# Patient Record
Sex: FEMALE | Race: WHITE | NOT HISPANIC OR LATINO | Employment: OTHER | RURAL
[De-identification: names, ages, dates, MRNs, and addresses within clinical notes are randomized per-mention and may not be internally consistent; named-entity substitution may affect disease eponyms.]

---

## 2018-05-01 LAB — HEP C VIRUS AB: NORMAL

## 2018-06-11 ENCOUNTER — HISTORICAL (OUTPATIENT)
Dept: ADMINISTRATIVE | Facility: HOSPITAL | Age: 63
End: 2018-06-11

## 2018-06-14 LAB
LAB AP CLINICAL INFORMATION: NORMAL
LAB AP GENERAL CAT - HISTORICAL: NORMAL
LAB AP INTERPRETATION/RESULT - HISTORICAL: NEGATIVE
LAB AP SPECIMEN ADEQUACY - HISTORICAL: NORMAL
LAB AP SPECIMEN SUBMITTED - HISTORICAL: NORMAL

## 2019-09-23 ENCOUNTER — HISTORICAL (OUTPATIENT)
Dept: ADMINISTRATIVE | Facility: HOSPITAL | Age: 64
End: 2019-09-23

## 2019-10-08 LAB
ESTRIOL SERPL-MCNC: NORMAL NG/ML
LAB AP CAP CHECKLIST - HISTORICAL: NORMAL
LAB AP CLINICAL INFORMATION: NORMAL
LAB AP COMMENTS: NORMAL
LAB AP DIAGNOSIS - HISTORICAL: NORMAL
LAB AP GROSS PATHOLOGY - HISTORICAL: NORMAL
LAB AP SPECIMEN SUBMITTED - HISTORICAL: NORMAL

## 2019-10-15 ENCOUNTER — HISTORICAL (OUTPATIENT)
Dept: ADMINISTRATIVE | Facility: HOSPITAL | Age: 64
End: 2019-10-15

## 2019-10-17 LAB
LAB AP CLINICAL INFORMATION: NORMAL
LAB AP COMMENTS: NORMAL
LAB AP DIAGNOSIS - HISTORICAL: NORMAL
LAB AP GROSS PATHOLOGY - HISTORICAL: NORMAL
LAB AP SPECIMEN SUBMITTED - HISTORICAL: NORMAL

## 2020-11-03 ENCOUNTER — HISTORICAL (OUTPATIENT)
Dept: ADMINISTRATIVE | Facility: HOSPITAL | Age: 65
End: 2020-11-03

## 2021-11-08 ENCOUNTER — HOSPITAL ENCOUNTER (OUTPATIENT)
Dept: RADIOLOGY | Facility: HOSPITAL | Age: 66
Discharge: HOME | End: 2021-11-08
Payer: MEDICARE

## 2021-11-08 VITALS — HEIGHT: 63 IN | BODY MASS INDEX: 29.41 KG/M2 | WEIGHT: 166 LBS

## 2021-11-08 DIAGNOSIS — Z12.31 VISIT FOR SCREENING MAMMOGRAM: ICD-10-CM

## 2021-11-08 PROCEDURE — 77067 SCR MAMMO BI INCL CAD: CPT | Mod: 26,,, | Performed by: RADIOLOGY

## 2021-11-08 PROCEDURE — 77067 MAMMO DIGITAL SCREENING BILAT: ICD-10-PCS | Mod: 26,,, | Performed by: RADIOLOGY

## 2021-11-08 PROCEDURE — 77067 SCR MAMMO BI INCL CAD: CPT | Mod: TC

## 2021-11-12 RX ORDER — NEBIVOLOL 5 MG/1
5 TABLET ORAL NIGHTLY
COMMUNITY

## 2021-11-12 RX ORDER — NAPROXEN SODIUM 220 MG/1
81 TABLET, FILM COATED ORAL DAILY
COMMUNITY

## 2021-11-12 RX ORDER — HYDROCHLOROTHIAZIDE 12.5 MG/1
12.5 CAPSULE ORAL DAILY
COMMUNITY

## 2021-11-12 RX ORDER — ACETAMINOPHEN 500 MG
1000 TABLET ORAL 3 TIMES DAILY PRN
COMMUNITY

## 2021-11-15 ENCOUNTER — OFFICE VISIT (OUTPATIENT)
Dept: FAMILY MEDICINE | Facility: CLINIC | Age: 66
End: 2021-11-15
Payer: MEDICARE

## 2021-11-15 VITALS
DIASTOLIC BLOOD PRESSURE: 68 MMHG | HEIGHT: 63 IN | TEMPERATURE: 98 F | WEIGHT: 167 LBS | RESPIRATION RATE: 14 BRPM | BODY MASS INDEX: 29.59 KG/M2 | SYSTOLIC BLOOD PRESSURE: 132 MMHG | OXYGEN SATURATION: 99 % | HEART RATE: 74 BPM

## 2021-11-15 DIAGNOSIS — Z13.1 ENCOUNTER FOR SCREENING FOR DIABETES MELLITUS: ICD-10-CM

## 2021-11-15 DIAGNOSIS — E78.5 HYPERLIPIDEMIA, UNSPECIFIED HYPERLIPIDEMIA TYPE: ICD-10-CM

## 2021-11-15 DIAGNOSIS — Z23 ENCOUNTER FOR IMMUNIZATION: Primary | ICD-10-CM

## 2021-11-15 DIAGNOSIS — Z78.0 ASYMPTOMATIC MENOPAUSAL STATE: ICD-10-CM

## 2021-11-15 LAB
CHOLEST SERPL-MCNC: 215 MG/DL (ref 0–200)
CHOLEST/HDLC SERPL: 2.9 {RATIO}
GLUCOSE SERPL-MCNC: 89 MG/DL (ref 74–106)
HDLC SERPL-MCNC: 75 MG/DL (ref 40–60)
LDLC SERPL CALC-MCNC: 105 MG/DL
LDLC/HDLC SERPL: 1.4 {RATIO}
NONHDLC SERPL-MCNC: 140 MG/DL
TRIGL SERPL-MCNC: 174 MG/DL (ref 35–150)
VLDLC SERPL-MCNC: 35 MG/DL

## 2021-11-15 PROCEDURE — 80061 LIPID PANEL: ICD-10-PCS | Mod: ,,, | Performed by: CLINICAL MEDICAL LABORATORY

## 2021-11-15 PROCEDURE — G0008 FLU VACCINE - QUADRIVALENT - HIGH DOSE (65+) PRESERVATIVE FREE IM: ICD-10-PCS | Mod: ,,, | Performed by: FAMILY MEDICINE

## 2021-11-15 PROCEDURE — G0439 PPPS, SUBSEQ VISIT: HCPCS | Mod: ,,, | Performed by: FAMILY MEDICINE

## 2021-11-15 PROCEDURE — 82947 ASSAY GLUCOSE BLOOD QUANT: CPT | Mod: ,,, | Performed by: CLINICAL MEDICAL LABORATORY

## 2021-11-15 PROCEDURE — 90662 IIV NO PRSV INCREASED AG IM: CPT | Mod: ,,, | Performed by: FAMILY MEDICINE

## 2021-11-15 PROCEDURE — 82947 GLUCOSE, FASTING: ICD-10-PCS | Mod: ,,, | Performed by: CLINICAL MEDICAL LABORATORY

## 2021-11-15 PROCEDURE — 80061 LIPID PANEL: CPT | Mod: ,,, | Performed by: CLINICAL MEDICAL LABORATORY

## 2021-11-15 PROCEDURE — G0008 ADMIN INFLUENZA VIRUS VAC: HCPCS | Mod: ,,, | Performed by: FAMILY MEDICINE

## 2021-11-15 PROCEDURE — G0439 PR MEDICARE ANNUAL WELLNESS SUBSEQUENT VISIT: ICD-10-PCS | Mod: ,,, | Performed by: FAMILY MEDICINE

## 2021-11-15 PROCEDURE — 90662 FLU VACCINE - QUADRIVALENT - HIGH DOSE (65+) PRESERVATIVE FREE IM: ICD-10-PCS | Mod: ,,, | Performed by: FAMILY MEDICINE

## 2021-11-15 RX ORDER — MAGNESIUM 250 MG
500 TABLET ORAL DAILY
COMMUNITY

## 2021-11-15 RX ORDER — TOPIRAMATE 25 MG/1
50 TABLET ORAL 2 TIMES DAILY
COMMUNITY

## 2021-11-15 RX ORDER — LETROZOLE 2.5 MG/1
2.5 TABLET, FILM COATED ORAL DAILY
COMMUNITY

## 2021-11-15 RX ORDER — AMOXICILLIN 500 MG
1 CAPSULE ORAL DAILY
COMMUNITY

## 2021-11-15 RX ORDER — ROPINIROLE 0.5 MG/1
0.5 TABLET, FILM COATED ORAL 3 TIMES DAILY
COMMUNITY

## 2021-11-15 RX ORDER — MULTIVIT WITH MINERALS/HERBS
1 TABLET ORAL DAILY
COMMUNITY

## 2021-11-15 RX ORDER — VITAMIN E 268 MG
400 CAPSULE ORAL DAILY
COMMUNITY

## 2021-11-15 RX ORDER — PYRIDOXINE HCL (VITAMIN B6) 100 MG
100 TABLET ORAL DAILY
COMMUNITY

## 2021-11-15 RX ORDER — ASPIRIN 325 MG
325 TABLET ORAL DAILY
COMMUNITY

## 2021-11-15 RX ORDER — ZINC GLUCONATE 50 MG
50 TABLET ORAL DAILY
COMMUNITY

## 2021-11-15 RX ORDER — RIZATRIPTAN BENZOATE 5 MG/1
5 TABLET ORAL
COMMUNITY

## 2021-11-15 RX ORDER — OMEPRAZOLE 20 MG/1
20 CAPSULE, DELAYED RELEASE ORAL DAILY PRN
COMMUNITY

## 2021-11-15 RX ORDER — CYCLOBENZAPRINE HCL 5 MG
1 TABLET ORAL EVERY 8 HOURS PRN
COMMUNITY
Start: 2021-09-30

## 2021-11-15 RX ORDER — INDOMETHACIN 50 MG/1
1 CAPSULE ORAL EVERY 12 HOURS PRN
COMMUNITY
Start: 2021-09-30

## 2021-11-18 PROBLEM — E78.5 HYPERLIPIDEMIA: Status: ACTIVE | Noted: 2021-11-18

## 2021-11-18 PROBLEM — Z23 ENCOUNTER FOR IMMUNIZATION: Status: ACTIVE | Noted: 2021-11-18

## 2021-11-18 PROBLEM — Z13.1 ENCOUNTER FOR SCREENING FOR DIABETES MELLITUS: Status: ACTIVE | Noted: 2021-11-18

## 2021-11-18 PROBLEM — Z78.0 ASYMPTOMATIC MENOPAUSAL STATE: Status: ACTIVE | Noted: 2021-11-18

## 2021-12-02 ENCOUNTER — HOSPITAL ENCOUNTER (OUTPATIENT)
Dept: RADIOLOGY | Facility: HOSPITAL | Age: 66
Discharge: HOME | End: 2021-12-02
Attending: FAMILY MEDICINE
Payer: MEDICARE

## 2021-12-02 DIAGNOSIS — Z78.0 ASYMPTOMATIC MENOPAUSAL STATE: ICD-10-CM

## 2021-12-02 PROCEDURE — 77080 DXA BONE DENSITY AXIAL: CPT | Mod: TC

## 2022-01-27 ENCOUNTER — OFFICE VISIT (OUTPATIENT)
Dept: FAMILY MEDICINE | Facility: CLINIC | Age: 67
End: 2022-01-27
Payer: MEDICARE

## 2022-01-27 VITALS
OXYGEN SATURATION: 97 % | TEMPERATURE: 99 F | DIASTOLIC BLOOD PRESSURE: 62 MMHG | HEART RATE: 78 BPM | WEIGHT: 163 LBS | RESPIRATION RATE: 20 BRPM | SYSTOLIC BLOOD PRESSURE: 104 MMHG | BODY MASS INDEX: 28.87 KG/M2

## 2022-01-27 DIAGNOSIS — R50.9 FEVER, UNSPECIFIED FEVER CAUSE: ICD-10-CM

## 2022-01-27 DIAGNOSIS — U07.1 COVID: Primary | ICD-10-CM

## 2022-01-27 DIAGNOSIS — R05.9 COUGH: ICD-10-CM

## 2022-01-27 DIAGNOSIS — J18.9 PNEUMONIA DUE TO INFECTIOUS ORGANISM, UNSPECIFIED LATERALITY, UNSPECIFIED PART OF LUNG: ICD-10-CM

## 2022-01-27 LAB
CTP QC/QA: YES
FLUAV AG NPH QL: NEGATIVE
FLUBV AG NPH QL: NEGATIVE
S PYO RRNA THROAT QL PROBE: NEGATIVE
SARS-COV-2 AG RESP QL IA.RAPID: POSITIVE

## 2022-01-27 PROCEDURE — 87426 SARSCOV CORONAVIRUS AG IA: CPT | Mod: RHCUB | Performed by: NURSE PRACTITIONER

## 2022-01-27 PROCEDURE — 99214 OFFICE O/P EST MOD 30 MIN: CPT | Mod: CR,,, | Performed by: NURSE PRACTITIONER

## 2022-01-27 PROCEDURE — 87804 INFLUENZA ASSAY W/OPTIC: CPT | Mod: RHCUB | Performed by: NURSE PRACTITIONER

## 2022-01-27 PROCEDURE — 96372 THER/PROPH/DIAG INJ SC/IM: CPT | Mod: ,,, | Performed by: NURSE PRACTITIONER

## 2022-01-27 PROCEDURE — 96372 PR INJECTION,THERAP/PROPH/DIAG2ST, IM OR SUBCUT: ICD-10-PCS | Mod: ,,, | Performed by: NURSE PRACTITIONER

## 2022-01-27 PROCEDURE — 99214 PR OFFICE/OUTPT VISIT, EST, LEVL IV, 30-39 MIN: ICD-10-PCS | Mod: CR,,, | Performed by: NURSE PRACTITIONER

## 2022-01-27 PROCEDURE — 87880 STREP A ASSAY W/OPTIC: CPT | Mod: RHCUB | Performed by: NURSE PRACTITIONER

## 2022-01-27 RX ORDER — AZITHROMYCIN 250 MG/1
TABLET, FILM COATED ORAL
Qty: 6 TABLET | Refills: 0 | Status: SHIPPED | OUTPATIENT
Start: 2022-01-27 | End: 2022-02-01

## 2022-01-27 RX ORDER — PREDNISONE 10 MG/1
10 TABLET ORAL 2 TIMES DAILY
Qty: 10 TABLET | Refills: 0 | Status: SHIPPED | OUTPATIENT
Start: 2022-01-27

## 2022-01-27 RX ORDER — FAMOTIDINE 20 MG/1
20 TABLET, FILM COATED ORAL 2 TIMES DAILY
Qty: 20 TABLET | Refills: 0 | Status: SHIPPED | OUTPATIENT
Start: 2022-01-27 | End: 2022-02-24

## 2022-01-27 RX ORDER — DEXAMETHASONE SODIUM PHOSPHATE 4 MG/ML
4 INJECTION, SOLUTION INTRA-ARTICULAR; INTRALESIONAL; INTRAMUSCULAR; INTRAVENOUS; SOFT TISSUE
Status: COMPLETED | OUTPATIENT
Start: 2022-01-27 | End: 2022-01-27

## 2022-01-27 RX ORDER — CEFTRIAXONE 1 G/1
1 INJECTION, POWDER, FOR SOLUTION INTRAMUSCULAR; INTRAVENOUS
Status: COMPLETED | OUTPATIENT
Start: 2022-01-27 | End: 2022-01-27

## 2022-01-27 RX ADMIN — DEXAMETHASONE SODIUM PHOSPHATE 4 MG: 4 INJECTION, SOLUTION INTRA-ARTICULAR; INTRALESIONAL; INTRAMUSCULAR; INTRAVENOUS; SOFT TISSUE at 04:01

## 2022-01-27 RX ADMIN — CEFTRIAXONE 1 G: 1 INJECTION, POWDER, FOR SOLUTION INTRAMUSCULAR; INTRAVENOUS at 04:01

## 2022-01-27 NOTE — PATIENT INSTRUCTIONS
? If you develop symptoms get a test and stay home  *Applies to individuals who completed the primary series of Pfizer or Moderna ? Please let your provider know you have been exposed if you do go in for testing.  ? If you live in a household with a person who is a confirmed case of COVID-19, your last  exposure is when is when you last had contact less than 6 feet for 15 minutes or more.  ? During the 5 day quarantine at home you may be allowed Covid Medication List          Vitamin D 5,000 units twice a day     Zinc 50 mg twice a day     Pepcid 20 mg once a day     Zyrtec 10 mg once a day     Aspirin 325 mg once a day     Vitamin C 1000 mg twice a day     Melatonin 3 mg at bedtime     Increase fluid intake and rest!      Stay home until:    At least 5 days have passed since your symptoms began (or since your positive test, if you have no symptoms),    AND you have no more symptoms, or your symptoms are improving and you have no fever and do not need fever-reducing medication such as Tylenol (acetaminophen) or Advil (ibuprofen).    Remain home after 5 days as long as you have a fever. Remaining at home is important to prevent transmitting infection to others.      The [place] Department of Health ([facility]) recommends the following after  exposure to a COVID-19 infected person:  Any exposed individual who develops symptoms should be tested and stay home.  If you have had a booster shot OR you have been fully vaccinated but are not yet eligible for  a booster because of timing*, you should:  ? Wear a mask around others for 10 days.  ? Test on day 5, if possible.  ? If you develop symptoms get a test and stay home.  *Applies to individuals who completed the primary series of Pfizer or Moderna vaccine within the last 6  months OR completed the primary series of J&J vaccine within the last 2 months  If you are unvaccinated OR are currently eligible for a booster dose but have not yet received  one* you should:  ?  Stay home for 5 days. After that continue to wear a mask around others for 5 additional  days.  ? If you cant quarantine you must wear a mask for 10 days.  ? Test on day 5 if possible.vaccine over 6 months  ago and are not boosted OR completed the primary series of J&J over 2 months ago and are not  boosted  Additional considerations:to continue to work if your employer  says you are essential, and you continue to have no symptoms, undergo symptom and  temperature monitoring by your facility and wear a mask while you are at work and around  others. Contact your employer for approval.  o If you do return to work, you should continue to self-quarantine at home at all other  times.  Official CDC Update on Isolation and Quarantine can be found at  https://www.cdc.gov/media/releases/2021/s1227-isolation-quarantine-guidance.html  Patient Education       Pneumonia Discharge Instructions, Adult   About this topic   Pneumonia is an infection in your lungs. It is most often caused by bacteria and viruses. You may develop a fever, cough, have trouble breathing, feel weak, or have chest pain. Pneumonia can cause you to need help breathing with a machine called a ventilator. In some cases, pneumonia can even cause death.           What care is needed at home?   · Ask your doctor what you need to do when you go home. Make sure you ask questions if you do not understand what the doctor says.  · The doctor may have ordered antibiotics to treat an infection. It is important to take all your antibiotics even if you start to feel better.  · If you smoke, try to quit. Your doctor or nurse can help you with this.  · Stay away from smoke-filled places. Avoid other things that may cause breathing problems like fumes, pollution, dust, and other common allergens.  · If you have an inhaler to take when you are feeling short of breath, be sure to carry it with you all the time. Then, you can take it when needed. Take all your other medicines  as directed.  · Drink lots of water, juice, or broth to replace fluids lost through runny nose and fever.  · Take warm, steamy showers to help soothe the cough.  · Use a cool mist humidifier. This will make it easier to breathe.  · Use hard candy or cough drops to soothe sore throat and cough.  · Wash your hands often. This will help keep others healthy.  What follow-up care is needed?   Your doctor may ask you to make visits to the office to check on your progress. Be sure to keep these visits.  What drugs may be needed?   The doctor may order drugs to:  · Treat infection  · Loosen secretions  · Lower fever  · Control coughing  · Open the airways  · Help with swelling in your airways and lungs  You may be given inhalers to help your breathing. Talk with your doctor about how to take all of your drugs.  Will physical activity be limited?   · Get enough rest while recovering from your illness.  · Talk with your doctor about when you can return to your normal activities.  What can be done to prevent this health problem?   · Always cover your cough with the inside of your arm.  · Wash your hands often with soap and water for at least 20 seconds, especially after coughing or sneezing. Alcohol-based hand sanitizers also work.  · Do not get too close (kissing, hugging) to people who are sick. Ask visitors who have colds to wear a mask.  · If you have a cold, stay home from work or school. Wear a mask to help from spreading the infection.  · Do not share towels or hankies with anyone who is sick.  · Eat a healthy diet.  · Get a flu shot each year. Ask your doctor if you need a pneumonia shot or any other vaccines.  · Do not smoke or be around smoke or vape.  · Avoid drinking too much alcohol. Too much alcohol can lower your ability to fight off pneumonia.  When do I need to call the doctor?   · You are having so much trouble breathing that you can only say one or two words at a time.  · You need to sit upright at all times  to be able to breathe and/or cannot lie down.  · You have trouble breathing when talking or sitting still.  · Your shortness of breath has not gotten better after a few days.  · You are coughing up blood.  · You have a fever of 100.4°F (38°C) or higher or chills, even after taking your medicines.  · Your symptoms are not getting better in 3 to 4 days.  · You are still coughing in 3 to 4 weeks.  Teach Back: Helping You Understand   The Teach Back Method helps you understand the information we are giving you. After you talk with the staff, tell them in your own words what you learned. This helps to make sure the staff has described each thing clearly. It also helps to explain things that may have been confusing. Before going home, make sure you can do these:  · I can tell you about my condition.  · I can tell you what I can do to help avoid passing the infection to others.  · I can tell you what I will do if I have more trouble breathing, feel short of breath at rest, or my cough does not get better.  Where can I learn more?   American Lung Association  http://www.lungusa.org/lung-disease/pneumonia/understanding-pneumonia.html   Centers for Disease Control and Prevention  https://www.cdc.gov/pneumonia/index.html   FamilyDoctor.org  https://familydoctor.org/condition/pneumonia/   NHS Choices  https://www.nhs.uk/conditions/pneumonia/   Last Reviewed Date   2021-06-07  Consumer Information Use and Disclaimer   This information is not specific medical advice and does not replace information you receive from your health care provider. This is only a brief summary of general information. It does NOT include all information about conditions, illnesses, injuries, tests, procedures, treatments, therapies, discharge instructions or life-style choices that may apply to you. You must talk with your health care provider for complete information about your health and treatment options. This information should not be used to decide  whether or not to accept your health care providers advice, instructions or recommendations. Only your health care provider has the knowledge and training to provide advice that is right for you.  Copyright   Copyright © 2021 UpToDate, Inc. and its affiliates and/or licensors. All rights reserved.  Patient Education       Pneumonia, Adult ED   General Information   You came to the Emergency Department (ED) for pneumonia. Bacteria, viruses, or other germs can cause this infection. The doctor may order antibiotics or other medicines, based on the cause of your illness. You should start to feel better within about a week. You may feel tired and have a cough for a longer time.  What care is needed at home?   · Call your regular doctor to let them know you were in the ED. Make a follow-up appointment if you were told to.  · If you smoke, try to quit. Your doctor or nurse can help you with this.  · Stay away from smoke-filled places. Avoid other things that may cause breathing problems like fumes, pollution, dust, and other common allergens.  · If you have an inhaler to take when you are feeling short of breath, be sure to carry it with you all the time. Then, you can take it when needed. Take all your other medicines as directed.  · Drink lots of water, juice, or broth to replace fluids lost through runny nose and fever.  · Take warm, steamy showers to help soothe the cough.  · Use a cool mist humidifier. This will make it easier to breathe.  · Use hard candy or cough drops to soothe sore throat and cough.  · Wash your hands often. This will help keep others healthy.  When do I need to get emergency help?   · Call for an ambulance right away if:   ? You are having so much trouble breathing that you can only say one or two words at a time.  ? You need to sit upright at all times to be able to breathe and/or cannot lie down.  ? You are coughing up a lot of blood (more than 1 teaspoon or 5 mL).  · Return to the ED if:    ? You have a fever of 100.4°F (38°C) or higher or chills, even after taking your medicines.  ? You have trouble breathing when talking or sitting still.  When do I need to call the doctor?   · Your shortness of breath has not gotten better after a few days.  · You cough up a small amount of blood (less than 1 teaspoon or 5 mL).  · Your symptoms are not getting better in 3 to 4 days.  · You are still coughing in 3 to 4 weeks.  · You have new or worsening symptoms.  Last Reviewed Date   2020-09-16  Consumer Information Use and Disclaimer   This information is not specific medical advice and does not replace information you receive from your health care provider. This is only a brief summary of general information. It does NOT include all information about conditions, illnesses, injuries, tests, procedures, treatments, therapies, discharge instructions or life-style choices that may apply to you. You must talk with your health care provider for complete information about your health and treatment options. This information should not be used to decide whether or not to accept your health care providers advice, instructions or recommendations. Only your health care provider has the knowledge and training to provide advice that is right for you.  Copyright   Copyright © 2021 UpToDate, Inc. and its affiliates and/or licensors. All rights reserved.

## 2022-01-27 NOTE — PROGRESS NOTES
Monica Rivas NP   [facility]  [address] [address]  [place]     PATIENT NAME: Rachel Goldberg  : 1955  DATE: 22  MRN: [id number]      Billing Provider: Monica Rivas NP  Level of Service:   Patient PCP Information     Provider PCP Type    Nicho Castañeda DO General          Reason for Visit / Chief Complaint: Cough (C/o cough. Fever, h/a, sinus congestion)       Update PCP  Update Chief Complaint         History of Present Illness / Problem Focused Workflow     Rachel Goldberg presents to the clinic   C/o cough, fever, headache, sinus congestion x 6 days    Review of Systems     Review of Systems   Constitutional: Positive for fatigue and fever. Negative for chills.   HENT: Positive for nasal congestion and sinus pressure/congestion. Negative for ear pain, facial swelling, hearing loss, mouth dryness, mouth sores, postnasal drip, rhinorrhea and goiter.    Eyes: Negative for discharge and itching.   Respiratory: Positive for cough. Negative for shortness of breath and wheezing.    Cardiovascular: Negative for chest pain and leg swelling.   Gastrointestinal: Negative for abdominal pain, change in bowel habit and change in bowel habit.   Genitourinary: Negative for difficulty urinating, dysuria, enuresis, frequency, hematuria and urgency.   Neurological: Negative for dizziness, vertigo, syncope, weakness and headaches.   Psychiatric/Behavioral: Negative for decreased concentration.        Medical / Social / Family History     Past Medical History:   Diagnosis Date    A-fib     Arthritis     Breast cancer     Hyperlipidemia        Past Surgical History:   Procedure Laterality Date    BREAST LUMPECTOMY      CATARACT EXTRACTION      HYSTERECTOMY      OOPHORECTOMY      TUBAL LIGATION         Social History  Ms.  reports that she has never smoked. She has never used smokeless tobacco. She reports that she does not drink alcohol and does not use drugs.    Family History  Ms.'s family history  includes Cancer in her brother; Colon cancer in her paternal grandmother; Heart disease in her brother, father, mother, and son; Hypertension in her father and mother; Prostate cancer in her father; Thyroid disease in her father and mother.    Medications and Allergies     Medications  Outpatient Medications Marked as Taking for the 1/27/22 encounter (Office Visit) with Monica Rivas NP   Medication Sig Dispense Refill    aspirin 325 MG tablet Take 325 mg by mouth once daily.      aspirin 81 MG Chew Take 81 mg by mouth once daily.      b complex vitamins tablet Take 1 tablet by mouth once daily.      cyclobenzaprine (FLEXERIL) 5 MG tablet Take 1 tablet by mouth every 8 (eight) hours as needed.      hydroCHLOROthiazide (MICROZIDE) 12.5 mg capsule Take 12.5 mg by mouth once daily.      indomethacin (INDOCIN) 50 MG capsule Take 1 capsule by mouth every 12 (twelve) hours as needed.      INV folate 400 MCG tablet Take 800 mcg by mouth once daily. FOR INVESTIGATIONAL USE ONLY      Lactobacillus acidophilus (PROBIOTIC ORAL) Take 1 capsule by mouth 2 (two) times a day.      letrozole (FEMARA) 2.5 mg Tab Take 2.5 mg by mouth once daily.      magnesium 250 mg Tab Take 500 mg by mouth once daily.      multivitamin with minerals tablet Take 1 tablet by mouth once daily.      nebivoloL (BYSTOLIC) 5 MG Tab Take 5 mg by mouth every evening.      omega-3 fatty acids/fish oil (FISH OIL-OMEGA-3 FATTY ACIDS) 300-1,000 mg capsule Take 1 capsule by mouth once daily.      omeprazole (PRILOSEC) 20 MG capsule Take 20 mg by mouth daily as needed.      pyridoxine, vitamin B6, (B-6) 100 MG Tab Take 100 mg by mouth once daily.      rizatriptan (MAXALT) 5 MG tablet Take 5 mg by mouth every 2 (two) hours as needed for Migraine.      rOPINIRole (REQUIP) 0.5 MG tablet Take 0.5 mg by mouth 3 (three) times daily.      topiramate (TOPAMAX) 25 MG tablet Take 50 mg by mouth 2 (two) times daily.      vitamin E 400 UNIT capsule Take  400 Units by mouth once daily.      zinc gluconate 50 mg tablet Take 50 mg by mouth once daily.       Current Facility-Administered Medications for the 1/27/22 encounter (Office Visit) with Monica Rivas NP   Medication Dose Route Frequency Provider Last Rate Last Admin    [COMPLETED] cefTRIAXone injection 1 g  1 g Intramuscular 1 time in Clinic/HOD Monica Rivas NP   1 g at 01/27/22 1635    [COMPLETED] dexamethasone injection 4 mg  4 mg Intramuscular 1 time in Clinic/HOD Monica Rivas NP   4 mg at 01/27/22 1636       Allergies  Review of patient's allergies indicates:   Allergen Reactions    Toprol xl [metoprolol succinate] Palpitations       Physical Examination     Vitals:    01/27/22 1439   BP: 104/62   Pulse: 78   Resp: 20   Temp: 98.6 °F (37 °C)   SpO2: 97%   Weight: 73.9 kg (163 lb)      Physical Exam  Constitutional:       Appearance: Normal appearance.   HENT:      Head: Normocephalic.      Right Ear: Tympanic membrane, ear canal and external ear normal.      Left Ear: Tympanic membrane, ear canal and external ear normal.      Nose: Nose normal.      Comments: Mod amt thick yellow nasal secretion, pressure over max region     Mouth/Throat:      Mouth: Mucous membranes are moist.      Pharynx: Oropharynx is clear.   Eyes:      Extraocular Movements: Extraocular movements intact.      Conjunctiva/sclera: Conjunctivae normal.      Pupils: Pupils are equal, round, and reactive to light.   Neck:      Comments: Sai ant cervical nodes  Cardiovascular:      Rate and Rhythm: Normal rate and regular rhythm.      Pulses: Normal pulses.      Heart sounds: Normal heart sounds.   Pulmonary:      Effort: Pulmonary effort is normal.      Breath sounds: Normal breath sounds.      Comments: Coughs at intervals, mild rales noted   Abdominal:      General: Bowel sounds are normal.      Palpations: Abdomen is soft.   Musculoskeletal:         General: Normal range of motion.      Cervical back: Normal range of motion.    Lymphadenopathy:      Cervical: Cervical adenopathy present.   Skin:     General: Skin is warm and dry.      Capillary Refill: Capillary refill takes less than 2 seconds.   Neurological:      General: No focal deficit present.      Mental Status: She is alert and oriented to person, place, and time.   Psychiatric:         Mood and Affect: Mood normal.         Behavior: Behavior normal.          Assessment and Plan (including Health Maintenance)      Problem List  Smart Sets  Document Outside HM   :    Plan: ? due to symptoms, will also treat pt for pneumonia     If you develop symptoms get a test and stay home  *Applies to individuals who completed the primary series of Pfizer or Moderna ? Please let your provider know you have been exposed if you do go in for testing.  ? If you live in a household with a person who is a confirmed case of COVID-19, your last  exposure is when is when you last had contact less than 6 feet for 15 minutes or more.  ? During the 5 day quarantine at home you may be allowed Covid Medication List          Vitamin D 5,000 units twice a day     Zinc 50 mg twice a day     Pepcid 20 mg once a day     Zyrtec 10 mg once a day     Aspirin 325 mg once a day     Vitamin C 1000 mg twice a day     Melatonin 3 mg at bedtime     Increase fluid intake and rest!      Stay home until:    At least 5 days have passed since your symptoms began (or since your positive test, if you have no symptoms),    AND you have no more symptoms, or your symptoms are improving and you have no fever and do not need fever-reducing medication such as Tylenol (acetaminophen) or Advil (ibuprofen).    Remain home after 5 days as long as you have a fever. Remaining at home is important to prevent transmitting infection to others.      The [place] Department of Health ([facility]) recommends the following after  exposure to a COVID-19 infected person:  Any exposed individual who develops symptoms should be tested and stay  home.  If you have had a booster shot OR you have been fully vaccinated but are not yet eligible for  a booster because of timing*, you should:  ? Wear a mask around others for 10 days.  ? Test on day 5, if possible.  ? If you develop symptoms get a test and stay home.  *Applies to individuals who completed the primary series of Pfizer or Moderna vaccine within the last 6  months OR completed the primary series of J&J vaccine within the last 2 months  If you are unvaccinated OR are currently eligible for a booster dose but have not yet received  one* you should:  ? Stay home for 5 days. After that continue to wear a mask around others for 5 additional  days.  ? If you cant quarantine you must wear a mask for 10 days.  ? Test on day 5 if possible.vaccine over 6 months  ago and are not boosted OR completed the primary series of J&J over 2 months ago and are not  boosted  Additional considerations:to continue to work if your employer  says you are essential, and you continue to have no symptoms, undergo symptom and  temperature monitoring by your facility and wear a mask while you are at work and around  others. Contact your employer for approval.  o If you do return to work, you should continue to self-quarantine at home at all other  times.  Official CDC Update on Isolation and Quarantine can be found at  https://www.cdc.gov/media/releases/2021/s1227-isolation-quarantine-guidance.html      COVID  -     X-Ray Chest PA And Lateral; Future; Expected date: 01/27/2022  -     azithromycin (Z-MARTINEZ) 250 MG tablet; Take 2 tablets by mouth on day 1; Take 1 tablet by mouth on days 2-5  Dispense: 6 tablet; Refill: 0  -     famotidine (PEPCID) 20 MG tablet; Take 1 tablet (20 mg total) by mouth 2 (two) times daily.  Dispense: 20 tablet; Refill: 0  -     predniSONE (DELTASONE) 10 MG tablet; Take 1 tablet (10 mg total) by mouth 2 (two) times daily.  Dispense: 10 tablet; Refill: 0    Fever, unspecified fever cause  -     SARS  Coronavirus 2 Antigen, POCT  -     POCT Influenza A/B  -     POCT rapid strep A  -     X-Ray Chest PA And Lateral; Future; Expected date: 01/27/2022    Cough  -     X-Ray Chest PA And Lateral; Future; Expected date: 01/27/2022    Pneumonia due to infectious organism, unspecified laterality, unspecified part of lung  -     cefTRIAXone injection 1 g  -     dexamethasone injection 4 mg            Health Maintenance Due   Topic Date Due    Shingles Vaccine (1 of 2) Never done       Problem List Items Addressed This Visit        Pulmonary    Pneumonia due to infectious organism    Relevant Medications    cefTRIAXone injection 1 g (Completed)    dexamethasone injection 4 mg (Completed)    Cough    Relevant Orders    X-Ray Chest PA And Lateral (Completed)       Other    COVID - Primary    Relevant Medications    azithromycin (Z-MARTINEZ) 250 MG tablet    famotidine (PEPCID) 20 MG tablet    predniSONE (DELTASONE) 10 MG tablet    Other Relevant Orders    X-Ray Chest PA And Lateral (Completed)    Fever    Relevant Orders    SARS Coronavirus 2 Antigen, POCT (Completed)    POCT Influenza A/B (Completed)    POCT rapid strep A (Completed)    X-Ray Chest PA And Lateral (Completed)            Health Maintenance Topics with due status: Not Due       Topic Last Completion Date    TETANUS VACCINE 05/01/2018    Colorectal Cancer Screening 06/21/2018    Mammogram 11/08/2021    Lipid Panel 11/15/2021    DEXA SCAN 12/02/2021       Procedures     Future Appointments   Date Time Provider Department Center   1/31/2022 11:00 AM Nicho Castañeda DO [facility] [place]   2/9/2022  8:15 AM [facility]    11/18/2022 10:30 AM [facility] [place]   [facility]2022  [facility]        Follow up for monday with dr castañeda.       Signature:  Monica Rivas NP    Date of encounter: 1/27/22

## 2022-01-31 ENCOUNTER — OFFICE VISIT (OUTPATIENT)
Dept: FAMILY MEDICINE | Facility: CLINIC | Age: 67
End: 2022-01-31
Payer: MEDICARE

## 2022-01-31 VITALS
SYSTOLIC BLOOD PRESSURE: 130 MMHG | BODY MASS INDEX: 28.88 KG/M2 | OXYGEN SATURATION: 97 % | WEIGHT: 163 LBS | HEART RATE: 60 BPM | RESPIRATION RATE: 16 BRPM | DIASTOLIC BLOOD PRESSURE: 76 MMHG | HEIGHT: 63 IN | TEMPERATURE: 98 F

## 2022-01-31 DIAGNOSIS — U07.1 COVID-19: Primary | ICD-10-CM

## 2022-01-31 PROCEDURE — 99213 OFFICE O/P EST LOW 20 MIN: CPT | Mod: CR,,, | Performed by: FAMILY MEDICINE

## 2022-01-31 PROCEDURE — 99213 PR OFFICE/OUTPT VISIT, EST, LEVL III, 20-29 MIN: ICD-10-PCS | Mod: CR,,, | Performed by: FAMILY MEDICINE

## 2022-01-31 RX ORDER — BENZONATATE 100 MG/1
100 CAPSULE ORAL 3 TIMES DAILY PRN
Qty: 21 CAPSULE | Refills: 0 | Status: SHIPPED | OUTPATIENT
Start: 2022-01-31 | End: 2022-02-07

## 2022-01-31 NOTE — PROGRESS NOTES
Nicho Castañeda DO   [facility]  [address]  [place]     PATIENT NAME: Rachel Goldberg  : 1955  DATE: 22  MRN: [id number]      Billing Provider: Nicho Castañeda DO  Level of Service:   Patient PCP Information     Provider PCP Type    Nicho Castañeda DO General          Reason for Visit / Chief Complaint: Pneumonia (F/u from last Thursday. Had Covid and pneumonia. ) and COVID-19 Concerns       Update PCP  Update Chief Complaint         History of Present Illness / Problem Focused Workflow     Rachel Goldberg presents to the clinic had covid and PNA last week. Reports compliance with antibiotics.states feels better.       Review of Systems     Review of Systems   Constitutional: Negative.    Eyes: Negative.    Respiratory: Negative.    Cardiovascular: Negative.    Gastrointestinal: Negative.         Medical / Social / Family History     Past Medical History:   Diagnosis Date    A-fib     Arthritis     Breast cancer     Hyperlipidemia        Past Surgical History:   Procedure Laterality Date    BREAST LUMPECTOMY      CATARACT EXTRACTION      HYSTERECTOMY      OOPHORECTOMY      TUBAL LIGATION         Social History  Ms.  reports that she has never smoked. She has never used smokeless tobacco. She reports that she does not drink alcohol and does not use drugs.    Family History  Ms.'s family history includes Cancer in her brother; Colon cancer in her paternal grandmother; Heart disease in her brother, father, mother, and son; Hypertension in her father and mother; Prostate cancer in her father; Thyroid disease in her father and mother.    Medications and Allergies     Medications  Outpatient Medications Marked as Taking for the 22 encounter (Office Visit) with Nicho Castañeda DO   Medication Sig Dispense Refill    acetaminophen (TYLENOL) 500 MG tablet Take 1,000 mg by mouth 3 (three) times daily as needed for Pain.      aspirin 325 MG tablet Take 325 mg by  mouth once daily.      azithromycin (Z-MARTINEZ) 250 MG tablet Take 2 tablets by mouth on day 1; Take 1 tablet by mouth on days 2-5 6 tablet 0    b complex vitamins tablet Take 1 tablet by mouth once daily.      cyclobenzaprine (FLEXERIL) 5 MG tablet Take 1 tablet by mouth every 8 (eight) hours as needed.      famotidine (PEPCID) 20 MG tablet Take 1 tablet (20 mg total) by mouth 2 (two) times daily. 20 tablet 0    hydroCHLOROthiazide (MICROZIDE) 12.5 mg capsule Take 12.5 mg by mouth once daily.      indomethacin (INDOCIN) 50 MG capsule Take 1 capsule by mouth every 12 (twelve) hours as needed.      INV folate 400 MCG tablet Take 800 mcg by mouth once daily. FOR INVESTIGATIONAL USE ONLY      Lactobacillus acidophilus (PROBIOTIC ORAL) Take 1 capsule by mouth 2 (two) times a day.      letrozole (FEMARA) 2.5 mg Tab Take 2.5 mg by mouth once daily.      magnesium 250 mg Tab Take 500 mg by mouth once daily.      multivitamin with minerals tablet Take 1 tablet by mouth once daily.      nebivoloL (BYSTOLIC) 5 MG Tab Take 5 mg by mouth every evening.      omega-3 fatty acids/fish oil (FISH OIL-OMEGA-3 FATTY ACIDS) 300-1,000 mg capsule Take 1 capsule by mouth once daily.      omeprazole (PRILOSEC) 20 MG capsule Take 20 mg by mouth daily as needed.      predniSONE (DELTASONE) 10 MG tablet Take 1 tablet (10 mg total) by mouth 2 (two) times daily. 10 tablet 0    pyridoxine, vitamin B6, (B-6) 100 MG Tab Take 100 mg by mouth once daily.      rizatriptan (MAXALT) 5 MG tablet Take 5 mg by mouth every 2 (two) hours as needed for Migraine.      rOPINIRole (REQUIP) 0.5 MG tablet Take 0.5 mg by mouth 3 (three) times daily.      topiramate (TOPAMAX) 25 MG tablet Take 50 mg by mouth 2 (two) times daily.      vitamin E 400 UNIT capsule Take 400 Units by mouth once daily.      zinc gluconate 50 mg tablet Take 50 mg by mouth once daily.         Allergies  Review of patient's allergies indicates:   Allergen Reactions     "Toprol xl [metoprolol succinate] Palpitations       Physical Examination     Vitals:    01/31/22 1141   BP: 130/76   BP Location: Right arm   Patient Position: Sitting   Pulse: 60   Resp: 16   Temp: 98.1 °F (36.7 °C)   TempSrc: Oral   SpO2: 97%   Weight: 73.9 kg (163 lb)   Height: 5' 3" (1.6 m)      Physical Exam  Vitals and nursing note reviewed.   Constitutional:       General: She is not in acute distress.     Appearance: Normal appearance. She is normal weight. She is not ill-appearing, toxic-appearing or diaphoretic.   Cardiovascular:      Rate and Rhythm: Normal rate and regular rhythm.      Pulses: Normal pulses.      Heart sounds: Normal heart sounds.   Pulmonary:      Effort: Pulmonary effort is normal.      Breath sounds: Normal breath sounds.   Neurological:      Mental Status: She is alert.          Assessment and Plan (including Health Maintenance)      Problem List  Smart Sets  Document Outside HM   :    Plan:   tessalon  Educated pt on intermittent cough persisting after lung infection for up to 90days.  progress in weeks not days    Health Maintenance Due   Topic Date Due    Shingles Vaccine (1 of 2) Never done       Problem List Items Addressed This Visit    None       There are no diagnoses linked to this encounter.   Health Maintenance Topics with due status: Not Due       Topic Last Completion Date    TETANUS VACCINE 05/01/2018    Colorectal Cancer Screening 06/21/2018    Mammogram 11/08/2021    Lipid Panel 11/15/2021    DEXA SCAN 12/02/2021       Procedures     Future Appointments   Date Time Provider Department Center   2/9/2022  8:15 AM [facility]    11/18/2022 10:30 AM AWV NURSE, [facility] MELONIE [place]   [facility]        No follow-ups on file.       Signature:  Nicho Castañeda DO    Date of encounter: 1/31/22    Education Documentation  No documentation found.  Education " Comments  No comments found.       There are no Patient Instructions on file for this visit.

## 2022-02-09 ENCOUNTER — HOSPITAL ENCOUNTER (OUTPATIENT)
Dept: RADIOLOGY | Facility: HOSPITAL | Age: 67
Discharge: HOME | End: 2022-02-09
Attending: NURSE PRACTITIONER
Payer: MEDICARE

## 2022-02-09 DIAGNOSIS — M54.59 OTHER LOW BACK PAIN: ICD-10-CM

## 2022-02-09 PROCEDURE — 72148 MRI LUMBAR SPINE W/O DYE: CPT | Mod: TC

## 2022-02-21 PROBLEM — Z13.1 ENCOUNTER FOR SCREENING FOR DIABETES MELLITUS: Status: RESOLVED | Noted: 2021-11-18 | Resolved: 2022-02-21

## 2022-03-11 DIAGNOSIS — Z71.89 COMPLEX CARE COORDINATION: ICD-10-CM

## 2022-10-09 DIAGNOSIS — Z71.89 COMPLEX CARE COORDINATION: ICD-10-CM

## 2022-12-08 ENCOUNTER — HOSPITAL ENCOUNTER (OUTPATIENT)
Dept: RADIOLOGY | Facility: HOSPITAL | Age: 67
Discharge: HOME | End: 2022-12-08
Payer: MEDICARE

## 2022-12-08 DIAGNOSIS — Z12.31 VISIT FOR SCREENING MAMMOGRAM: ICD-10-CM

## 2022-12-08 PROCEDURE — 77067 SCR MAMMO BI INCL CAD: CPT | Mod: 26,,, | Performed by: RADIOLOGY

## 2022-12-08 PROCEDURE — 77067 SCR MAMMO BI INCL CAD: CPT | Mod: TC

## 2022-12-08 PROCEDURE — 77067 MAMMO DIGITAL SCREENING BILAT: ICD-10-PCS | Mod: 26,,, | Performed by: RADIOLOGY

## 2024-01-03 ENCOUNTER — HOSPITAL ENCOUNTER (OUTPATIENT)
Dept: RADIOLOGY | Facility: HOSPITAL | Age: 69
Discharge: HOME | End: 2024-01-03
Payer: MEDICARE

## 2024-01-03 DIAGNOSIS — Z12.31 VISIT FOR SCREENING MAMMOGRAM: ICD-10-CM

## 2024-01-03 PROCEDURE — 77067 SCR MAMMO BI INCL CAD: CPT | Mod: 26,,, | Performed by: RADIOLOGY

## 2024-01-03 PROCEDURE — 77067 SCR MAMMO BI INCL CAD: CPT | Mod: TC

## 2024-04-12 ENCOUNTER — OFFICE VISIT (OUTPATIENT)
Dept: FAMILY MEDICINE | Facility: CLINIC | Age: 69
End: 2024-04-12
Payer: MEDICARE

## 2024-04-12 VITALS
WEIGHT: 170.63 LBS | DIASTOLIC BLOOD PRESSURE: 86 MMHG | TEMPERATURE: 97 F | OXYGEN SATURATION: 96 % | RESPIRATION RATE: 19 BRPM | SYSTOLIC BLOOD PRESSURE: 128 MMHG | HEIGHT: 63 IN | HEART RATE: 70 BPM | BODY MASS INDEX: 30.23 KG/M2

## 2024-04-12 DIAGNOSIS — L23.7 POISON IVY DERMATITIS: Primary | ICD-10-CM

## 2024-04-12 PROCEDURE — 99213 OFFICE O/P EST LOW 20 MIN: CPT | Mod: ,,, | Performed by: STUDENT IN AN ORGANIZED HEALTH CARE EDUCATION/TRAINING PROGRAM

## 2024-04-12 RX ORDER — METHYLPREDNISOLONE 4 MG/1
TABLET ORAL
Qty: 21 EACH | Refills: 0 | Status: SHIPPED | OUTPATIENT
Start: 2024-04-12 | End: 2024-05-03

## 2024-04-12 RX ORDER — TRIAMCINOLONE ACETONIDE 1 MG/G
OINTMENT TOPICAL 2 TIMES DAILY
Qty: 15 G | Refills: 0 | Status: SHIPPED | OUTPATIENT
Start: 2024-04-12

## 2024-04-12 NOTE — PROGRESS NOTES
Progress Note     KIN MONTENEGRO MD   [address]  [place] [address]     PATIENT NAME: Rachel Goldberg  : 1955  DATE: 24  MRN: [id number]      Billing Provider: KIN MONTENEGRO MD  Level of Service:   Patient PCP Information       Provider PCP Type    MAITE Llamas General                Rash (Patient states that she had gotten into some poison ivy at home and has been breaking out all over. patient states that she noticed it a day before yesterday. )      SUBJECTIVE:     Rachel Goldberg is a 68 y.o.female who presents to clinic for Rash (Patient states that she had gotten into some poison ivy at home and has been breaking out all over. patient states that she noticed it a day before yesterday. )    Patient presents to clinic today after exposure to poison ivy.  Patient states that she was working in her yd over the weekend.  She was known history of poison ivy dermatitis.  Patient states that Monday/Tuesday she started to develop lesions.  She developed a new lesion yesterday.  Patient states that she was developing lesions on her legs, her upper extremities, her hands, and now her neck face.  She notes that the rash is pruritic.  Patient has had steroids in the past and has tolerated them without difficulty.    All other pertinent review of systems negative. Please see HPI for details.     Past Medical History:  has a past medical history of A-fib, Arthritis, Breast cancer, and Hyperlipidemia.   Past Surgical History:  has a past surgical history that includes Breast lumpectomy; Hysterectomy; Oophorectomy; Cataract extraction; and Tubal ligation.  Family History: family history includes Cancer in her brother; Colon cancer in her paternal grandmother; Heart disease in her brother, father, mother, and son; Hypertension in her father and mother; Prostate cancer in her father; Thyroid disease in her father and mother.  Social History:  reports that she has never  smoked. She has never been exposed to tobacco smoke. She has never used smokeless tobacco. She reports that she does not drink alcohol and does not use drugs.  Allergies:   Review of patient's allergies indicates:   Allergen Reactions    Metoprolol succinate Palpitations and Other (See Comments)     Pain in jaw, chest tightening         Current Outpatient Medications:     b complex vitamins tablet, Take 1 tablet by mouth once daily., Disp: , Rfl:     cyclobenzaprine (FLEXERIL) 5 MG tablet, Take 1 tablet by mouth every 8 (eight) hours as needed., Disp: , Rfl:     hydroCHLOROthiazide (MICROZIDE) 12.5 mg capsule, Take 12.5 mg by mouth once daily., Disp: , Rfl:     indomethacin (INDOCIN) 50 MG capsule, Take 1 capsule by mouth every 12 (twelve) hours as needed., Disp: , Rfl:     INV folate 400 MCG tablet, Take 800 mcg by mouth once daily. FOR INVESTIGATIONAL USE ONLY, Disp: , Rfl:     Lactobacillus acidophilus (PROBIOTIC ORAL), Take 1 capsule by mouth 2 (two) times a day., Disp: , Rfl:     letrozole (FEMARA) 2.5 mg Tab, Take 2.5 mg by mouth once daily., Disp: , Rfl:     magnesium 250 mg Tab, Take 500 mg by mouth once daily., Disp: , Rfl:     multivitamin with minerals tablet, Take 1 tablet by mouth once daily., Disp: , Rfl:     nebivoloL (BYSTOLIC) 5 MG Tab, Take 5 mg by mouth every evening., Disp: , Rfl:     omega-3 fatty acids/fish oil (FISH OIL-OMEGA-3 FATTY ACIDS) 300-1,000 mg capsule, Take 1 capsule by mouth once daily., Disp: , Rfl:     omeprazole (PRILOSEC) 20 MG capsule, Take 20 mg by mouth daily as needed., Disp: , Rfl:     pyridoxine, vitamin B6, (B-6) 100 MG Tab, Take 100 mg by mouth once daily., Disp: , Rfl:     rizatriptan (MAXALT) 5 MG tablet, Take 5 mg by mouth every 2 (two) hours as needed for Migraine., Disp: , Rfl:     rOPINIRole (REQUIP) 0.5 MG tablet, Take 0.25 mg by mouth 3 (three) times daily., Disp: , Rfl:     topiramate (TOPAMAX) 25 MG tablet, Take 50 mg by mouth 2 (two) times daily., Disp: , Rfl:  "    vitamin E 400 UNIT capsule, Take 400 Units by mouth once daily., Disp: , Rfl:     zinc gluconate 50 mg tablet, Take 50 mg by mouth once daily., Disp: , Rfl:     acetaminophen (TYLENOL) 500 MG tablet, Take 1,000 mg by mouth 3 (three) times daily as needed for Pain. (Patient not taking: Reported on 4/12/2024), Disp: , Rfl:     aspirin 325 MG tablet, Take 325 mg by mouth once daily. (Patient not taking: Reported on 4/12/2024), Disp: , Rfl:     aspirin 81 MG Chew, Take 81 mg by mouth once daily. (Patient not taking: Reported on 4/12/2024), Disp: , Rfl:     famotidine (PEPCID) 20 MG tablet, Take 1 tablet (20 mg total) by mouth 2 (two) times daily., Disp: 20 tablet, Rfl: 0    methylPREDNISolone (MEDROL DOSEPACK) 4 mg tablet, use as directed, Disp: 21 each, Rfl: 0    triamcinolone acetonide 0.1% (KENALOG) 0.1 % ointment, Apply topically 2 (two) times daily., Disp: 15 g, Rfl: 0   OBJECTIVE:     Vital Signs   /86 (BP Location: Left arm, Patient Position: Sitting, BP Method: Medium (Manual))   Pulse 70   Temp 97.3 °F (36.3 °C)   Resp 19   Ht 5' 3" (1.6 m)   Wt 77.4 kg (170 lb 9.6 oz)   SpO2 96%   BMI 30.22 kg/m²     Physical Exam  Constitutional:       General: She is not in acute distress.     Appearance: Normal appearance. She is not ill-appearing, toxic-appearing or diaphoretic.   HENT:      Head: Normocephalic and atraumatic.   Eyes:      Extraocular Movements: Extraocular movements intact.      Pupils: Pupils are equal, round, and reactive to light.   Cardiovascular:      Rate and Rhythm: Normal rate and regular rhythm.      Pulses: Normal pulses.      Heart sounds: Normal heart sounds. No murmur heard.     No friction rub. No gallop.   Pulmonary:      Effort: Pulmonary effort is normal. No respiratory distress.      Breath sounds: No wheezing, rhonchi or rales.   Abdominal:      General: Abdomen is flat.      Palpations: Abdomen is soft.      Tenderness: There is no abdominal tenderness. There is no " guarding or rebound.   Musculoskeletal:         General: Normal range of motion.      Cervical back: Normal range of motion.   Skin:     General: Skin is warm and dry.      Capillary Refill: Capillary refill takes less than 2 seconds.      Comments: Vesicular rash with erythematous base noted on left lower extremity, bilateral hands/arms, left neck and left cheek.   Neurological:      General: No focal deficit present.      Mental Status: She is alert.   Psychiatric:         Mood and Affect: Mood normal.         Behavior: Behavior normal.         ASSESSMENT/PLAN:     1. Poison ivy dermatitis  -     methylPREDNISolone (MEDROL DOSEPACK) 4 mg tablet; use as directed  Dispense: 21 each; Refill: 0  -     triamcinolone acetonide 0.1% (KENALOG) 0.1 % ointment; Apply topically 2 (two) times daily.  Dispense: 15 g; Refill: 0    Patient with what appears to be poison ivy dermatitis.  Given rash present on face, we will provide Medrol Dosepak.  Counseled patient to use triamcinolone ointment on extremities.  Advised her use hydrocortisone ointment on face.  Patient verbalized understanding.  Counseled patient on emergency precautions.  Patient verbalized understanding.  Patient to follow up if symptoms worsen or fail to improve.    Follow up if symptoms worsen or fail to improve.      KIN MONTENEGRO MD  04/12/2024    Due to voice recognition software, sound alike and misspelled words may be contained in the documentation.

## 2024-05-09 DIAGNOSIS — Z71.89 COMPLEX CARE COORDINATION: ICD-10-CM

## 2024-07-16 LAB
CHOLEST SERPL-MSCNC: 225 MG/DL (ref 0–200)
CHOLEST/HDLC SERPL: 3.1 {RATIO}
HDLC SERPL-MCNC: 72 MG/DL (ref 35–70)
LDL CHOLESTEROL DIRECT: 137 MG/DL
NONHDLC SERPL-MCNC: 153 MG/DL
TRIGL SERPL-MCNC: 115 MG/DL (ref 40–160)
VLDL CHOLESTEROL: 23 MG/DL

## 2024-07-30 ENCOUNTER — OFFICE VISIT (OUTPATIENT)
Dept: FAMILY MEDICINE | Facility: CLINIC | Age: 69
End: 2024-07-30
Payer: MEDICARE

## 2024-07-30 VITALS
TEMPERATURE: 98 F | HEART RATE: 70 BPM | RESPIRATION RATE: 16 BRPM | SYSTOLIC BLOOD PRESSURE: 118 MMHG | BODY MASS INDEX: 31.54 KG/M2 | WEIGHT: 178 LBS | DIASTOLIC BLOOD PRESSURE: 70 MMHG | OXYGEN SATURATION: 96 % | HEIGHT: 63 IN

## 2024-07-30 DIAGNOSIS — E78.49 OTHER HYPERLIPIDEMIA: ICD-10-CM

## 2024-07-30 DIAGNOSIS — R73.9 ELEVATED BLOOD SUGAR: ICD-10-CM

## 2024-07-30 DIAGNOSIS — Z78.0 ENCOUNTER FOR OSTEOPOROSIS SCREENING IN ASYMPTOMATIC POSTMENOPAUSAL PATIENT: ICD-10-CM

## 2024-07-30 DIAGNOSIS — Z13.820 ENCOUNTER FOR OSTEOPOROSIS SCREENING IN ASYMPTOMATIC POSTMENOPAUSAL PATIENT: ICD-10-CM

## 2024-07-30 DIAGNOSIS — Z78.0 ASYMPTOMATIC MENOPAUSAL STATE: ICD-10-CM

## 2024-07-30 DIAGNOSIS — Z00.00 ENCOUNTER FOR SUBSEQUENT ANNUAL WELLNESS VISIT (AWV) IN MEDICARE PATIENT: Primary | ICD-10-CM

## 2024-07-30 PROCEDURE — G0439 PPPS, SUBSEQ VISIT: HCPCS | Mod: ,,, | Performed by: NURSE PRACTITIONER

## 2024-07-30 RX ORDER — ROPINIROLE 0.25 MG/1
0.5 TABLET, FILM COATED ORAL 2 TIMES DAILY
COMMUNITY
Start: 2024-06-01

## 2024-07-30 NOTE — PATIENT INSTRUCTIONS
Counseling and Referral of Other Preventative  (Italic type indicates deductible and co-insurance are waived)    Patient Name: Rachel Goldberg  Today's Date: 7/30/2024    Health Maintenance       Date Due Completion Date    Hemoglobin A1c (Diabetic Prevention Screening) Never done ---    Shingles Vaccine (1 of 2) Never done ---    RSV Vaccine (Age 60+ and Pregnant patients) (1 - 1-dose 60+ series) Never done ---    Lipid Panel 11/15/2022 11/15/2021    Override on 6/17/2019: Done    COVID-19 Vaccine (1 - 2023-24 season) Never done ---    DEXA Scan 12/02/2023 12/2/2021    Override on 11/19/2019: Done (normal)    Influenza Vaccine (1) 09/01/2024 11/15/2021    Mammogram 01/03/2025 1/3/2024    TETANUS VACCINE 05/01/2028 5/1/2018    Colorectal Cancer Screening 06/21/2028 6/21/2018        No orders of the defined types were placed in this encounter.    Counseling and Referral of Other Preventative  (Italic type indicates deductible and co-insurance are waived)    Patient Name: Rachel Goldberg  Today's Date: 7/30/2024    Health Maintenance       Date Due Completion Date    Hemoglobin A1c (Diabetic Prevention Screening) Never done ---    Shingles Vaccine (1 of 2) Never done ---    RSV Vaccine (Age 60+ and Pregnant patients) (1 - 1-dose 60+ series) Never done ---    Lipid Panel 11/15/2022 11/15/2021    Override on 6/17/2019: Done    COVID-19 Vaccine (1 - 2023-24 season) Never done ---    DEXA Scan 12/02/2023 12/2/2021    Override on 11/19/2019: Done (normal)    Influenza Vaccine (1) 09/01/2024 11/15/2021    Mammogram 01/03/2025 1/3/2024    TETANUS VACCINE 05/01/2028 5/1/2018    Colorectal Cancer Screening 06/21/2028 6/21/2018        No orders of the defined types were placed in this encounter.    The following information is provided to all patients.  This information is to help you find resources for any of the problems found today that may be affecting your health:                  Living healthy guide: [place].gov    Understanding  Diabetes: www.diabetes.org      Eating healthy: www.cdc.gov/healthyweight      CDC home safety checklist: www.cdc.gov/steadi/patient.html      Agency on Aging: [place].gov    Alcoholics anonymous (AA): www.aa.org      Physical Activity: www.coby.nih.gov/tj6wtno      Tobacco use: [place].gov

## 2024-07-30 NOTE — PROGRESS NOTES
"  Rachel Goldberg presented for a  Medicare AWV and comprehensive Health Risk Assessment today. The following components were reviewed and updated:    Medical history  Family History  Social history  Allergies and Current Medications  Health Risk Assessment  Health Maintenance  Care Team         ** See Completed Assessments for Annual Wellness Visit within the encounter summary.**         The following assessments were completed:  Living Situation  CAGE  Depression Screening  Timed Get Up and Go  Whisper Test  Cognitive Function Screening  Nutrition Screening  ADL Screening  PAQ Screening        Opioid Documentation    does not have a current opioid prescription.       Vitals:    07/30/24 1438   BP: 118/70   BP Location: Left arm   Patient Position: Sitting   Pulse: 70   Resp: 16   Temp: 98.1 °F (36.7 °C)   SpO2: 96%   Weight: 80.7 kg (178 lb)   Height: 5' 3" (1.6 m)     Body mass index is 31.53 kg/m².  Physical Exam  Constitutional:       General: She is not in acute distress.  HENT:      Head: Normocephalic.      Nose: Nose normal.      Mouth/Throat:      Mouth: Mucous membranes are moist.   Eyes:      Extraocular Movements: Extraocular movements intact.   Cardiovascular:      Rate and Rhythm: Normal rate.   Pulmonary:      Effort: Pulmonary effort is normal. No respiratory distress.   Abdominal:      General: Bowel sounds are normal.      Palpations: Abdomen is soft.   Musculoskeletal:         General: Normal range of motion.      Cervical back: Neck supple.   Skin:     General: Skin is warm.   Neurological:      Mental Status: She is alert.   Psychiatric:         Behavior: Behavior normal.               Diagnoses and health risks identified today and associated recommendations/orders:    Problem List Items Addressed This Visit          Cardiac/Vascular    Hyperlipidemia     Recently had lipids done with Dr Smart @ [facility]  Discussed statin therapy; Dr Smart has recommended this for her  States she will discuss " it further with her at appointment   Copy of lipids attached to chart             Renal/    Asymptomatic menopausal state     Other Visit Diagnoses       Encounter for subsequent annual wellness visit (AWV) in Medicare patient    -  Primary    Encounter for osteoporosis screening in asymptomatic postmenopausal patient        Relevant Orders    DXA Bone Density Axial Skeleton 1 or more sites    Elevated blood sugar        Relevant Orders    Hemoglobin A1C (Completed)            Provided Rachel with a 5-10 year written screening schedule and personal prevention plan. Recommendations were developed using the USPSTF age appropriate recommendations. Education, counseling, and referrals were provided as needed. After Visit Summary printed and given to patient which includes a list of additional screenings\tests needed.    PATIENT DECLINES VACCINES TODAY , WILL FOLLOW UP WITH PHARMACY AT LATER DATE IF DESIRED.    RECENT LIPID PANEL RESULTS RECEIVED FROM [facility] 07/22/2024 - WILL UPLOAD TO CHART     A1C ORDERED TODAY     DEXA SCAN / BONE DENSITY ORDERED TODAY AT [place], [place]     Follow up for 1 year annual wellness.    MAITE Llamas      I offered to discuss advanced care planning, including how to pick a person who would make decisions for you if you were unable to make them for yourself, called a health care power of , and what kind of decisions you might make such as use of life sustaining treatments such as ventilators and tube feeding when faced with a life limiting illness recorded on a living will that they will need to know. (How you want to be cared for as you near the end of your natural life)     X Patient is interested in learning more about how to make advanced directives.  I provided them paperwork and offered to discuss this with them.

## 2024-08-01 ENCOUNTER — TELEPHONE (OUTPATIENT)
Dept: FAMILY MEDICINE | Facility: CLINIC | Age: 69
End: 2024-08-01
Payer: MEDICARE

## 2024-08-01 PROBLEM — R05.9 COUGH: Status: RESOLVED | Noted: 2022-01-27 | Resolved: 2024-08-01

## 2024-08-01 PROBLEM — Z23 ENCOUNTER FOR IMMUNIZATION: Status: RESOLVED | Noted: 2021-11-18 | Resolved: 2024-08-01

## 2024-08-01 PROBLEM — R50.9 FEVER: Status: RESOLVED | Noted: 2022-01-27 | Resolved: 2024-08-01

## 2024-08-01 NOTE — ASSESSMENT & PLAN NOTE
Recently had lipids done with Dr Smart @ [facility]  Discussed statin therapy; Dr Smart has recommended this for her  States she will discuss it further with her at appointment   Copy of lipids attached to chart

## 2024-08-16 ENCOUNTER — HOSPITAL ENCOUNTER (OUTPATIENT)
Dept: RADIOLOGY | Facility: HOSPITAL | Age: 69
Discharge: HOME | End: 2024-08-16
Attending: NURSE PRACTITIONER
Payer: MEDICARE

## 2024-08-16 DIAGNOSIS — Z78.0 ENCOUNTER FOR OSTEOPOROSIS SCREENING IN ASYMPTOMATIC POSTMENOPAUSAL PATIENT: ICD-10-CM

## 2024-08-16 DIAGNOSIS — Z13.820 ENCOUNTER FOR OSTEOPOROSIS SCREENING IN ASYMPTOMATIC POSTMENOPAUSAL PATIENT: ICD-10-CM

## 2024-08-16 PROCEDURE — 77080 DXA BONE DENSITY AXIAL: CPT | Mod: TC

## 2024-08-20 DIAGNOSIS — M85.80 OSTEOPENIA, UNSPECIFIED LOCATION: Primary | ICD-10-CM

## 2024-08-20 RX ORDER — ALENDRONATE SODIUM 70 MG/1
70 TABLET ORAL
Qty: 12 TABLET | Refills: 1 | Status: SHIPPED | OUTPATIENT
Start: 2024-08-20 | End: 2025-08-20

## 2024-08-20 NOTE — PROGRESS NOTES
Discussed DEXA scan results and recommendations with pt via phone   No noted concerns  Voiced understanding

## 2024-12-16 ENCOUNTER — OFFICE VISIT (OUTPATIENT)
Dept: FAMILY MEDICINE | Facility: CLINIC | Age: 69
End: 2024-12-16
Payer: MEDICARE

## 2024-12-16 VITALS
SYSTOLIC BLOOD PRESSURE: 120 MMHG | TEMPERATURE: 99 F | HEIGHT: 63 IN | HEART RATE: 80 BPM | DIASTOLIC BLOOD PRESSURE: 78 MMHG | WEIGHT: 175.19 LBS | OXYGEN SATURATION: 98 % | RESPIRATION RATE: 18 BRPM | BODY MASS INDEX: 31.04 KG/M2

## 2024-12-16 DIAGNOSIS — R09.89 CHEST CONGESTION: ICD-10-CM

## 2024-12-16 DIAGNOSIS — J02.9 SORE THROAT: ICD-10-CM

## 2024-12-16 DIAGNOSIS — R05.1 ACUTE COUGH: ICD-10-CM

## 2024-12-16 DIAGNOSIS — J21.9 BRONCHIOLITIS: Primary | ICD-10-CM

## 2024-12-16 LAB
CTP QC/QA: YES
MOLECULAR STREP A: NEGATIVE
POC MOLECULAR INFLUENZA A AGN: NEGATIVE
POC MOLECULAR INFLUENZA B AGN: NEGATIVE
SARS-COV-2 RDRP RESP QL NAA+PROBE: NEGATIVE

## 2024-12-16 PROCEDURE — 87651 STREP A DNA AMP PROBE: CPT | Mod: RHCUB | Performed by: NURSE PRACTITIONER

## 2024-12-16 PROCEDURE — 96372 THER/PROPH/DIAG INJ SC/IM: CPT | Mod: ,,, | Performed by: NURSE PRACTITIONER

## 2024-12-16 PROCEDURE — 87502 INFLUENZA DNA AMP PROBE: CPT | Mod: RHCUB | Performed by: NURSE PRACTITIONER

## 2024-12-16 PROCEDURE — 99214 OFFICE O/P EST MOD 30 MIN: CPT | Mod: ,,, | Performed by: NURSE PRACTITIONER

## 2024-12-16 PROCEDURE — 87635 SARS-COV-2 COVID-19 AMP PRB: CPT | Mod: RHCUB | Performed by: NURSE PRACTITIONER

## 2024-12-16 RX ORDER — DEXAMETHASONE SODIUM PHOSPHATE 4 MG/ML
4 INJECTION, SOLUTION INTRA-ARTICULAR; INTRALESIONAL; INTRAMUSCULAR; INTRAVENOUS; SOFT TISSUE
Status: COMPLETED | OUTPATIENT
Start: 2024-12-16 | End: 2024-12-16

## 2024-12-16 RX ORDER — HYDROCHLOROTHIAZIDE 12.5 MG/1
12.5 TABLET ORAL
COMMUNITY
Start: 2024-10-01

## 2024-12-16 RX ORDER — ERENUMAB-AOOE 140 MG/ML
INJECTION, SOLUTION SUBCUTANEOUS
COMMUNITY
Start: 2024-12-11

## 2024-12-16 RX ORDER — AMOXICILLIN AND CLAVULANATE POTASSIUM 875; 125 MG/1; MG/1
1 TABLET, FILM COATED ORAL 2 TIMES DAILY
Qty: 20 TABLET | Refills: 0 | Status: SHIPPED | OUTPATIENT
Start: 2024-12-16

## 2024-12-16 RX ORDER — BENZONATATE 100 MG/1
100 CAPSULE ORAL 3 TIMES DAILY PRN
Qty: 30 CAPSULE | Refills: 0 | Status: SHIPPED | OUTPATIENT
Start: 2024-12-16 | End: 2024-12-26

## 2024-12-16 RX ORDER — CEFTRIAXONE 1 G/1
1 INJECTION, POWDER, FOR SOLUTION INTRAMUSCULAR; INTRAVENOUS
Status: COMPLETED | OUTPATIENT
Start: 2024-12-16 | End: 2024-12-16

## 2024-12-16 RX ORDER — DULOXETIN HYDROCHLORIDE 60 MG/1
60 CAPSULE, DELAYED RELEASE ORAL
COMMUNITY
Start: 2024-12-11

## 2024-12-16 RX ADMIN — DEXAMETHASONE SODIUM PHOSPHATE 4 MG: 4 INJECTION, SOLUTION INTRA-ARTICULAR; INTRALESIONAL; INTRAMUSCULAR; INTRAVENOUS; SOFT TISSUE at 02:12

## 2024-12-16 RX ADMIN — CEFTRIAXONE 1 G: 1 INJECTION, POWDER, FOR SOLUTION INTRAMUSCULAR; INTRAVENOUS at 02:12

## 2024-12-16 NOTE — PROGRESS NOTES
MAITE Llamas   [facility] [address] [place] [address]  [phone]      PATIENT NAME: Rachel Goldberg  : 1955  DATE: 24  MRN: [id number]      Billing Provider: MAITE Llamas  Level of Service:   Patient PCP Information       Provider PCP Type    MAITE Llamas General                Medications and Allergies     Medications  Outpatient Medications Marked as Taking for the 24 encounter (Office Visit) with Hemalatha Hargrove FNP   Medication Sig Dispense Refill    AIMOVIG AUTOINJECTOR 140 mg/mL autoinjector       alendronate (FOSAMAX) 70 MG tablet Take 1 tablet (70 mg total) by mouth every 7 days. 12 tablet 1    b complex vitamins tablet Take 1 tablet by mouth once daily.      cyclobenzaprine (FLEXERIL) 5 MG tablet Take 1 tablet by mouth every 8 (eight) hours as needed.      DULoxetine (CYMBALTA) 60 MG capsule Take 60 mg by mouth.      famotidine (PEPCID) 20 MG tablet Take 1 tablet (20 mg total) by mouth 2 (two) times daily. 20 tablet 0    hydroCHLOROthiazide (HYDRODIURIL) 12.5 MG Tab Take 12.5 mg by mouth.      indomethacin (INDOCIN) 50 MG capsule Take 1 capsule by mouth every 12 (twelve) hours as needed.      INV folate 400 MCG tablet Take 800 mcg by mouth once daily.      letrozole (FEMARA) 2.5 mg Tab Take 2.5 mg by mouth once daily.      magnesium 250 mg Tab Take 500 mg by mouth once daily.      multivitamin with minerals tablet Take 1 tablet by mouth once daily.      nebivoloL (BYSTOLIC) 5 MG Tab Take 5 mg by mouth every evening.      omega-3 fatty acids/fish oil (FISH OIL-OMEGA-3 FATTY ACIDS) 300-1,000 mg capsule Take 1 capsule by mouth once daily.      omeprazole (PRILOSEC) 20 MG capsule Take 20 mg by mouth daily as needed.      pyridoxine, vitamin B6, (B-6) 100 MG Tab Take 100 mg by mouth once daily.      rizatriptan (MAXALT) 5 MG tablet Take 5 mg by mouth every 2 (two) hours as needed for Migraine.      rOPINIRole  (REQUIP) 0.25 MG tablet Take 0.5 mg by mouth 2 (two) times daily.      rOPINIRole (REQUIP) 0.5 MG tablet Take 0.25 mg by mouth 3 (three) times daily.      vitamin E 400 UNIT capsule Take 400 Units by mouth once daily.      zinc gluconate 50 mg tablet Take 50 mg by mouth once daily.       Current Facility-Administered Medications for the 12/16/24 encounter (Office Visit) with Hemalatha Hargrove FNP   Medication Dose Route Frequency Provider Last Rate Last Admin    [COMPLETED] cefTRIAXone injection 1 g  1 g Intramuscular 1 time in Clinic/HOD Hemalatha Hargrove FNP   1 g at 12/16/24 1418    [COMPLETED] dexAMETHasone injection 4 mg  4 mg Intramuscular 1 time in Clinic/HOD Hemalatha Hargrove FNP   4 mg at 12/16/24 1418       Allergies  Review of patient's allergies indicates:   Allergen Reactions    Metoprolol succinate Palpitations and Other (See Comments)     Pain in jaw, chest tightening       History of Present Illness    CHIEF COMPLAINT:  Patient presents today with symptoms of a sinus cold.    HISTORY OF PRESENT ILLNESS:  She reports one week of sinus cold symptoms including sore throat (worse at night), thick yellow phlegm (worse at night and morning), and occasional sensation of heavy breathing. She notes increased phlegm production with coughing, most pronounced in the morning hours. Two grandchildren were diagnosed with strep throat this week.    ALLERGIES:  She reports an allergy to overalls.      ROS:  General: -fever, -chills, +fatigue, -weight gain, -weight loss  Eyes: -vision changes, -redness, -discharge  ENT: -ear pain, +nasal congestion, +sore throat  Cardiovascular: -chest pain, -palpitations, -lower extremity edema  Respiratory: +cough, -shortness of breath  Gastrointestinal: -abdominal pain, -nausea, -vomiting, -diarrhea, -constipation, -blood in stool  Genitourinary: -dysuria, -hematuria, -frequency  Musculoskeletal: -joint pain, -muscle pain  Skin: -rash, -lesion  Neurological: -headache, -dizziness,  -numbness, -tingling  Psychiatric: -anxiety, -depression, -sleep difficulty          Physical Exam    General: No acute distress. Well-developed. Well-nourished.  Eyes: EOMI. Sclerae anicteric.  HENT: Normocephalic. Atraumatic. Nares patent. Moist oral mucosa.  Ears: Bilateral TM redness.   Cardiovascular: Regular rate. Regular rhythm. No murmurs. No rubs. No gallops.   Respiratory: Normal respiratory effort. Clear to auscultation bilaterally. No rales. No rhonchi. No wheezing.  Abdomen: Soft. Non-tender. Non-distended. Normoactive bowel sounds.  Musculoskeletal: No  obvious deformity.  Extremities: No lower extremity edema.  Neurological: Alert & oriented x3. No slurred speech. Normal gait.  Psychiatric: Normal mood. Normal affect. Good insight. Good judgment.  Skin: Warm. Dry. No rash.          Assessment & Plan    IMPRESSION:  - Assessed patient's symptoms consistent with respiratory infection  - Ruled out strep, COVID-19, and influenza based on negative test results  - Considered impact of recent weather changes and exposure to grandchildren with strep  - Determined treatment approach based on symptom duration and upcoming travel plans    SINUSITIS AND LOWER RESPIRATORY INFECTION:  - Assessed the patient's condition as a sinus cold or lower respiratory infection based on symptoms and physical exam.  - Noted that the patient has had symptoms for about a week, including thick yellow phlegm, especially at night and in the morning.  - Performed physical exam and confirmed negative test results for strep, COVID, and flu.  - Explained that while the condition may not be highly contagious, it is not definitively non-contagious.  - Administered a shot for faster relief.  - Prescribed augmentin for treatment.  - Instructed the patient to monitor phlegm production, noting increased frequency in the mornings and at night.  - Assessed the condition as a sinus cold or infection based on physical exam and reported  symptoms.  - Noted that the patient reports thick yellow phlegm and drainage, especially at night and in the morning.  - Performed physical exam, confirming symptoms consistent with sinus infection.  - Prescribed augmentin for treatment of the infection.  - Noted that the patient reports occasional feeling of heaviness when breathing.    COUGH:  - Prescribed Tessalon pearls for cough management.  - Prescribed Tessalon pearls for cough, which may help alleviate breathing difficulties.    CONGESTION:  - Recommend an OTC decongestant containing chlorpheniramine.          Health Maintenance Due   Topic Date Due    Shingles Vaccine (1 of 2) Never done    RSV Vaccine (Age 60+ and Pregnant patients) (1 - Risk 60-74 years 1-dose series) Never done    Lipid Panel  11/15/2022    Influenza Vaccine (1) 09/01/2024    COVID-19 Vaccine (1 - 2024-25 season) Never done    Mammogram  01/03/2025       Problem List Items Addressed This Visit          Pulmonary    Bronchiolitis - Primary    Relevant Medications    cefTRIAXone injection 1 g (Completed)    dexAMETHasone injection 4 mg (Completed)    amoxicillin-clavulanate 875-125mg (AUGMENTIN) 875-125 mg per tablet     Other Visit Diagnoses       Acute cough        Relevant Medications    dexAMETHasone injection 4 mg (Completed)    benzonatate (TESSALON) 100 MG capsule    Other Relevant Orders    POCT COVID-19 Rapid Screening (Completed)    POCT Influenza A/B Molecular (Completed)    Sore throat        Relevant Orders    POCT Strep A, Molecular (Completed)    Chest congestion        Relevant Medications    dexAMETHasone injection 4 mg (Completed)            Health Maintenance Topics with due status: Not Due       Topic Last Completion Date    TETANUS VACCINE 05/01/2018    Colorectal Cancer Screening 06/21/2018    Hemoglobin A1c (Diabetic Prevention Screening) 07/31/2024    DEXA Scan 08/16/2024       Future Appointments   Date Time Provider Department Center   1/15/2025 10:20 AM [facility]  MAMMO1 [facility] M[facility]   1/30/2025  3:00 PM [facility]   7/29/2025  2:00 PM AWV [facility]        This note was generated with the assistance of ambient listening technology. Verbal consent was obtained by the patient and accompanying visitor(s) for the recording of patient appointment to facilitate this note. I attest to having reviewed and edited the generated note for accuracy, though some syntax or spelling errors may persist. Please contact the author of this note for any clarification.     Signature:  MAITE Llamas  [facility]  [address] [place] [id number]  [phone]    Date of encounter: 12/16/24

## 2024-12-18 ENCOUNTER — PATIENT OUTREACH (OUTPATIENT)
Facility: HOSPITAL | Age: 69
End: 2024-12-18
Payer: MEDICARE

## 2024-12-18 NOTE — LETTER
AUTHORIZATION FOR RELEASE OF   CONFIDENTIAL INFORMATION    Dear [facility] [place] [facility],    We are seeing Rachel Goldberg, date of birth 1955, in the clinic at [facility]. Hemalatha Hargrove FNP is the patient's PCP. Rachel Goldberg has an outstanding lab/procedure at the time we reviewed her chart. In order to help keep her health information updated, she has authorized us to request the following medical record(s):        (  )  MAMMOGRAM                                      (  )  COLONOSCOPY      (  )  PAP SMEAR                                          (X)  RECENT LIPID PANEL     (  )  DEXA SCAN                                          (  )  EYE EXAM            (  )  FOOT EXAM                                          (  )  ENTIRE RECORD     (  )  OUTSIDE IMMUNIZATIONS                 (  )  _______________         Please fax records to [facility] Coordinator, Lashawn Viramontes, [phone].     If you have any questions, please contact [phone].          Patient Name: Rachel Goldberg  : 1955  Patient Phone #: [phone]

## 2024-12-24 ENCOUNTER — PATIENT OUTREACH (OUTPATIENT)
Facility: HOSPITAL | Age: 69
End: 2024-12-24
Payer: MEDICARE

## 2025-01-15 ENCOUNTER — HOSPITAL ENCOUNTER (OUTPATIENT)
Dept: RADIOLOGY | Facility: HOSPITAL | Age: 70
Discharge: HOME | End: 2025-01-15
Payer: MEDICARE

## 2025-01-15 DIAGNOSIS — Z12.31 VISIT FOR SCREENING MAMMOGRAM: ICD-10-CM

## 2025-01-15 PROCEDURE — 77067 SCR MAMMO BI INCL CAD: CPT | Mod: 26,,, | Performed by: RADIOLOGY

## 2025-01-15 PROCEDURE — 77063 BREAST TOMOSYNTHESIS BI: CPT | Mod: 26,,, | Performed by: RADIOLOGY

## 2025-01-15 PROCEDURE — 77063 BREAST TOMOSYNTHESIS BI: CPT | Mod: TC

## 2025-01-27 ENCOUNTER — OFFICE VISIT (OUTPATIENT)
Dept: FAMILY MEDICINE | Facility: CLINIC | Age: 70
End: 2025-01-27
Payer: MEDICARE

## 2025-01-27 VITALS
OXYGEN SATURATION: 98 % | TEMPERATURE: 97 F | HEIGHT: 63 IN | SYSTOLIC BLOOD PRESSURE: 122 MMHG | BODY MASS INDEX: 33.98 KG/M2 | RESPIRATION RATE: 18 BRPM | HEART RATE: 80 BPM | WEIGHT: 191.81 LBS | DIASTOLIC BLOOD PRESSURE: 76 MMHG

## 2025-01-27 DIAGNOSIS — R05.1 ACUTE COUGH: ICD-10-CM

## 2025-01-27 DIAGNOSIS — J22 LOWER RESPIRATORY INFECTION: ICD-10-CM

## 2025-01-27 DIAGNOSIS — J10.1 INFLUENZA A: Primary | ICD-10-CM

## 2025-01-27 DIAGNOSIS — R09.89 CHEST CONGESTION: ICD-10-CM

## 2025-01-27 LAB
CTP QC/QA: YES
POC MOLECULAR INFLUENZA A AGN: POSITIVE
POC MOLECULAR INFLUENZA B AGN: NEGATIVE
POC RSV RAPID ANT MOLECULAR: NEGATIVE
SARS-COV-2 RDRP RESP QL NAA+PROBE: NEGATIVE

## 2025-01-27 PROCEDURE — 87635 SARS-COV-2 COVID-19 AMP PRB: CPT | Mod: RHCUB | Performed by: NURSE PRACTITIONER

## 2025-01-27 PROCEDURE — 87634 RSV DNA/RNA AMP PROBE: CPT | Mod: RHCUB | Performed by: NURSE PRACTITIONER

## 2025-01-27 PROCEDURE — 96372 THER/PROPH/DIAG INJ SC/IM: CPT | Mod: ,,, | Performed by: NURSE PRACTITIONER

## 2025-01-27 PROCEDURE — 99214 OFFICE O/P EST MOD 30 MIN: CPT | Mod: ,,, | Performed by: NURSE PRACTITIONER

## 2025-01-27 PROCEDURE — 87502 INFLUENZA DNA AMP PROBE: CPT | Mod: RHCUB | Performed by: NURSE PRACTITIONER

## 2025-01-27 RX ORDER — DEXAMETHASONE SODIUM PHOSPHATE 4 MG/ML
4 INJECTION, SOLUTION INTRA-ARTICULAR; INTRALESIONAL; INTRAMUSCULAR; INTRAVENOUS; SOFT TISSUE
Status: COMPLETED | OUTPATIENT
Start: 2025-01-27 | End: 2025-01-27

## 2025-01-27 RX ORDER — OSELTAMIVIR PHOSPHATE 75 MG/1
75 CAPSULE ORAL 2 TIMES DAILY
Qty: 10 CAPSULE | Refills: 0 | Status: SHIPPED | OUTPATIENT
Start: 2025-01-27 | End: 2025-02-01

## 2025-01-27 RX ORDER — AMOXICILLIN AND CLAVULANATE POTASSIUM 875; 125 MG/1; MG/1
1 TABLET, FILM COATED ORAL 2 TIMES DAILY
Qty: 20 TABLET | Refills: 0 | Status: SHIPPED | OUTPATIENT
Start: 2025-01-27

## 2025-01-27 RX ORDER — CEFTRIAXONE 1 G/1
1 INJECTION, POWDER, FOR SOLUTION INTRAMUSCULAR; INTRAVENOUS
Status: COMPLETED | OUTPATIENT
Start: 2025-01-27 | End: 2025-01-27

## 2025-01-27 RX ADMIN — DEXAMETHASONE SODIUM PHOSPHATE 4 MG: 4 INJECTION, SOLUTION INTRA-ARTICULAR; INTRALESIONAL; INTRAMUSCULAR; INTRAVENOUS; SOFT TISSUE at 02:01

## 2025-01-27 RX ADMIN — CEFTRIAXONE 1 G: 1 INJECTION, POWDER, FOR SOLUTION INTRAMUSCULAR; INTRAVENOUS at 02:01

## 2025-01-27 NOTE — PROGRESS NOTES
Health Maintenance Due   Topic Date Due    Shingles Vaccine (1 of 2) Never done    RSV Vaccine (Age 60+ and Pregnant patients) (1 - Risk 60-74 years 1-dose series) Never done    COVID-19 Vaccine (1 - 2024-25 season) Never done     Discussed care gaps.  Not interested in vaccs.

## 2025-01-28 DIAGNOSIS — J18.9 COMMUNITY ACQUIRED PNEUMONIA OF RIGHT MIDDLE LOBE OF LUNG: Primary | ICD-10-CM

## 2025-01-28 NOTE — PROGRESS NOTES
MAITE Llamas   [facility] [address] [place] [address]  [phone]      PATIENT NAME: Rachel Goldberg  : 1955  DATE: 25  MRN: [id number]      Billing Provider: MAITE Llamas  Level of Service:   Patient PCP Information       Provider PCP Type    MAITE Llamas General                Medications and Allergies     Medications  Outpatient Medications Marked as Taking for the 25 encounter (Office Visit) with Hemalatha Hargrove FNP   Medication Sig Dispense Refill    AIMOVIG AUTOINJECTOR 140 mg/mL autoinjector       alendronate (FOSAMAX) 70 MG tablet Take 1 tablet (70 mg total) by mouth every 7 days. 12 tablet 1    b complex vitamins tablet Take 1 tablet by mouth once daily.      cyclobenzaprine (FLEXERIL) 5 MG tablet Take 1 tablet by mouth every 8 (eight) hours as needed.      DULoxetine (CYMBALTA) 60 MG capsule Take 60 mg by mouth.      famotidine (PEPCID) 20 MG tablet Take 1 tablet (20 mg total) by mouth 2 (two) times daily. 20 tablet 0    hydroCHLOROthiazide (HYDRODIURIL) 12.5 MG Tab Take 12.5 mg by mouth.      indomethacin (INDOCIN) 50 MG capsule Take 1 capsule by mouth every 12 (twelve) hours as needed.      INV folate 400 MCG tablet Take 800 mcg by mouth once daily.      magnesium 250 mg Tab Take 500 mg by mouth once daily.      multivitamin with minerals tablet Take 1 tablet by mouth once daily.      nebivoloL (BYSTOLIC) 5 MG Tab Take 5 mg by mouth every evening.      omega-3 fatty acids/fish oil (FISH OIL-OMEGA-3 FATTY ACIDS) 300-1,000 mg capsule Take 1 capsule by mouth once daily.      omeprazole (PRILOSEC) 20 MG capsule Take 20 mg by mouth daily as needed.      pyridoxine, vitamin B6, (B-6) 100 MG Tab Take 100 mg by mouth once daily.      rizatriptan (MAXALT) 5 MG tablet Take 5 mg by mouth every 2 (two) hours as needed for Migraine.      rOPINIRole (REQUIP) 0.25 MG tablet Take 0.5 mg by mouth 2 (two) times daily.       rOPINIRole (REQUIP) 0.5 MG tablet Take 0.25 mg by mouth 3 (three) times daily.      vitamin E 400 UNIT capsule Take 400 Units by mouth once daily.      zinc gluconate 50 mg tablet Take 50 mg by mouth once daily.       Current Facility-Administered Medications for the 1/27/25 encounter (Office Visit) with Hemalatha Hargrove FNP   Medication Dose Route Frequency Provider Last Rate Last Admin    [COMPLETED] cefTRIAXone injection 1 g  1 g Intramuscular 1 time in Clinic/HOD Hemalatha Hargrove FNP   1 g at 01/27/25 1414    [COMPLETED] dexAMETHasone injection 4 mg  4 mg Intramuscular 1 time in Clinic/HOD Hemalatha Hargrove FNP   4 mg at 01/27/25 1414       Allergies  Review of patient's allergies indicates:   Allergen Reactions    Metoprolol succinate Palpitations and Other (See Comments)     Pain in jaw, chest tightening       History of Present Illness    CHIEF COMPLAINT:  Patient presents today with flu-like symptoms.    HISTORY OF PRESENT ILLNESS:  She started feeling unwell on Saturday with symptoms progressively worsening on Sunday. She reports ear pain with associated pressure sensation and unusual wheezing. She denies fever. She has had a persistent cough since an illness in December that has not fully resolved.    MEDICATIONS:  She denies taking any allergy medications.      ROS:  General: -fever, +chills, +fatigue, -weight gain, -weight loss  Eyes: -vision changes, -redness, -discharge  ENT: -ear pain, +nasal congestion, -sore throat  Cardiovascular: -chest pain, -palpitations, -lower extremity edema  Respiratory: +cough, -shortness of breath, +wheezing  Gastrointestinal: -abdominal pain, -nausea, -vomiting, -diarrhea, -constipation, -blood in stool  Genitourinary: -dysuria, -hematuria, -frequency  Musculoskeletal: -joint pain, -muscle pain  Skin: -rash, -lesion  Neurological: +headache, -dizziness, -numbness, -tingling  Psychiatric: -anxiety, -depression, -sleep difficulty          Physical Exam    General: No acute  distress. Well-developed. Well-nourished.  Eyes: EOMI. Sclerae anicteric.  HENT: Normocephalic. Atraumatic.redness to posterior pharynx.   Ears: Bilateral TMs clear. Redness to bilat TM.   Cardiovascular: Regular rate. Regular rhythm.   Respiratory: Normal respiratory effort. Bronchial wheezing. Coarse bilateral breath sounds.  Abdomen: Soft. Non-tender. Non-distended.   Musculoskeletal: No  obvious deformity.  Extremities: No lower extremity edema.  Neurological: Alert & oriented x3. No slurred speech. Normal gait.  Psychiatric: Normal mood. Normal affect. Good insight. Good judgment.  Skin: Warm. Dry. No rash.          Assessment & Plan    IMPRESSION:  - Diagnosed patient with influenza A and bacterial lower respiratory infection based on symptoms and exam findings  - Will treat with combination of antiviral, antibiotic, and steroids due to severity of symptoms and chest exam  - Considered patient's history of persistent cough and possible allergies as contributing factors    INFLUENZA:  - Assessed the patient's condition, noting worsening symptoms over the weekend, including cough with sputum production.  - Diagnosed the condition as viral, likely influenza.  - Educated the patient that antibiotics are not effective against viral infections like influenza.  - Prescribed Tamiflu for influenza A treatment.  - Prescribed Medrol Dosepak (oral steroid course) for symptom relief.  - Recommend nasal spray for additional symptom management.  - Instructed the patient to follow up if symptoms do not improve after completing prescribed medications.  - Noted the patient's reported ear pain and pressure.  - Attributed ear pain to the current influenza infection.  - Determined that the overall treatment for influenza is expected to address this symptom.  - Will monitor ear pain as part of the overall influenza treatment plan.    BACTERIAL LOWER RESPIRATORY INFECTION:  - Evaluated the patient's persistent cough and  congestion.  - Noted abnormal chest sounds, including wheezing, during physical exam.  - Ordered a chest XR to assess respiratory status.  - Prescribed Augmentin for bacterial lower respiratory infection.  - Prescribed steroids for inflammation reduction.  - Advised the patient to follow up if symptoms do not improve after completing prescribed medications.    ALLERGIES:  - Considered allergy-related causes for persistent symptoms.  - Recommend daily Zyrtec for allergy management.  - Considered adding Singulair for additional symptom management.  - Discussed potential benefits of daily antihistamine use for managing allergies and post-nasal drip.  - Started Zyrtec daily at night for allergy management.      FOLLOW UP:  - Instructed the patient to follow up if symptoms do not improve.       Health Maintenance Due   Topic Date Due    Shingles Vaccine (1 of 2) Never done    RSV Vaccine (Age 60+ and Pregnant patients) (1 - Risk 60-74 years 1-dose series) Never done    COVID-19 Vaccine (1 - 2024-25 season) Never done       Problem List Items Addressed This Visit    None  Visit Diagnoses       Influenza A    -  Primary    Relevant Medications    oseltamivir (TAMIFLU) 75 MG capsule    Acute cough        Relevant Orders    POCT COVID-19 Rapid Screening (Completed)    POCT Influenza A/B Molecular (Completed)    POCT respiratory syncytial virus (Completed)    X-Ray Chest PA And Lateral (Completed)    Lower respiratory infection        Relevant Medications    cefTRIAXone injection 1 g (Completed)    dexAMETHasone injection 4 mg (Completed)    amoxicillin-clavulanate 875-125mg (AUGMENTIN) 875-125 mg per tablet    Chest congestion        Relevant Medications    dexAMETHasone injection 4 mg (Completed)            Health Maintenance Topics with due status: Not Due       Topic Last Completion Date    TETANUS VACCINE 05/01/2018    Colorectal Cancer Screening 06/21/2018    Lipid Panel 07/16/2024    Hemoglobin A1c (Diabetic Prevention  Screening) 07/31/2024    DEXA Scan 08/16/2024    Mammogram 01/15/2025       Future Appointments   Date Time Provider Department Center   7/29/2025  2:00 PM AWV NURSE, [facility]   1/21/2026 10:00 AM [facility] MAMMO1 [facility] MAMMO [facility]        This note was generated with the assistance of ambient listening technology. Verbal consent was obtained by the patient and accompanying visitor(s) for the recording of patient appointment to facilitate this note. I attest to having reviewed and edited the generated note for accuracy, though some syntax or spelling errors may persist. Please contact the author of this note for any clarification.     Signature:  MAITE Llamas  [facility]  [address] [place] [id number]  [phone]    Date of encounter: 1/27/25

## 2025-02-03 ENCOUNTER — APPOINTMENT (OUTPATIENT)
Dept: RADIOLOGY | Facility: CLINIC | Age: 70
End: 2025-02-03
Attending: NURSE PRACTITIONER
Payer: MEDICARE

## 2025-02-03 DIAGNOSIS — R05.1 ACUTE COUGH: Primary | ICD-10-CM

## 2025-02-03 DIAGNOSIS — R05.1 ACUTE COUGH: ICD-10-CM

## 2025-02-03 PROCEDURE — 71046 X-RAY EXAM CHEST 2 VIEWS: CPT | Mod: 26,,, | Performed by: RADIOLOGY

## 2025-02-03 PROCEDURE — 71046 X-RAY EXAM CHEST 2 VIEWS: CPT | Mod: TC,RHCUB,FY | Performed by: NURSE PRACTITIONER

## 2025-03-05 ENCOUNTER — TELEPHONE (OUTPATIENT)
Dept: FAMILY MEDICINE | Facility: CLINIC | Age: 70
End: 2025-03-05
Payer: MEDICARE

## 2025-03-05 NOTE — TELEPHONE ENCOUNTER
----- Message from Rosetta sent at 3/5/2025 11:38 AM CST -----  Patient called in wanting speak with a nurse but didn't really specify why or what

## 2025-04-09 DIAGNOSIS — M85.80 OSTEOPENIA, UNSPECIFIED LOCATION: ICD-10-CM

## 2025-04-09 RX ORDER — ALENDRONATE SODIUM 70 MG/1
70 TABLET ORAL
Qty: 12 TABLET | Refills: 1 | Status: SHIPPED | OUTPATIENT
Start: 2025-04-09 | End: 2026-04-09